# Patient Record
(demographics unavailable — no encounter records)

---

## 2025-02-12 NOTE — ASSESSMENT
[FreeTextEntry1] : Assessment: CKD stage 3B: renal function has slightly improved HTN: controlled (140/78)  No peripheral edema Minimum proteinuria  DM2: controlled

## 2025-02-12 NOTE — REVIEW OF SYSTEMS
[TextEntry] : Review of Systems: General: No weight loss, No recent fever, chills HEENT: no headache, no change in vision or hearing Pulmonary: No SOB, or cough  CVS: No chest pain, CABRERA, or change in exercise tolerance  Gastrointestinal: No Nausea/vomiting/constipation/diarrhea : No complaint of dysuria or urinary frequency, no excess foaming  Skin: Denies no new rash, lesions, ulcer  Musco skeletal: No edema  Neurological: No headache, dizziness, vertigo

## 2025-02-12 NOTE — RESULTS/DATA
[TextEntry] : Labs: 01/13/25 141/3.5/105/23/20/1.27/gfr44/ca9.6 UPCR 0.1 HGB 12.6  8/6/24 141/3.1/101/31/1.49 egfr 36 ca 9.4 cbc 8.85/12.2/252  1/31/24 144/3.6/107/22/21/1.53/ EGFR 35 pth 48 Vit D 46 UA no protein  7/14/23: 140/4/107/21/21/1.28 ( 44) cva 9.7 CBC ; 7.4/12.4/240   143/3.6/102/25/20/1.34 e gfr 45 ca 9.8 cbc 7./12.8  3/21/17 144/3.9/106/24/15/1.38/ ca 9.6

## 2025-02-12 NOTE — PLAN
[TextEntry] : Plan: CKD stage 3B: HCTZ 25 mg  HTN: Amlodipine 10 mg, HCTZ 25 mg  DM2: Not on any medication Check Vitamin D levels Maintain physical activity  Follow up with recommended workups in 6 months

## 2025-02-12 NOTE — HISTORY OF PRESENT ILLNESS
[FreeTextEntry1] : 02/12/25 Reason for visit Follow up for CKD stage 3B HTN No significant interval events  She has been feeling tired   PMHx CKD stage 3B AAF HTN DM2 Hypothyroidism Depression  SH:  Former smoker No ETOH Has 8 children one daughter lives with her  AAF with PMH of HTN, DM ( on Oral Hypoglycemic agent) Hypothyroidism, depression comes for the evaluation of elevated BUN and creatinine noted on her routine check up.  PSH: Tunbal ligation